# Patient Record
Sex: FEMALE | Race: WHITE | NOT HISPANIC OR LATINO | Employment: OTHER | URBAN - METROPOLITAN AREA
[De-identification: names, ages, dates, MRNs, and addresses within clinical notes are randomized per-mention and may not be internally consistent; named-entity substitution may affect disease eponyms.]

---

## 2024-03-12 ENCOUNTER — OFFICE VISIT (OUTPATIENT)
Dept: NEUROSURGERY | Facility: CLINIC | Age: 84
End: 2024-03-12
Payer: MEDICARE

## 2024-03-12 VITALS
SYSTOLIC BLOOD PRESSURE: 112 MMHG | BODY MASS INDEX: 22.77 KG/M2 | DIASTOLIC BLOOD PRESSURE: 71 MMHG | WEIGHT: 133.4 LBS | HEIGHT: 64 IN

## 2024-03-12 DIAGNOSIS — D32.9 MENINGIOMA: Primary | ICD-10-CM

## 2024-03-12 PROCEDURE — 99203 OFFICE O/P NEW LOW 30 MIN: CPT | Performed by: NEUROLOGICAL SURGERY

## 2024-03-12 PROCEDURE — 1160F RVW MEDS BY RX/DR IN RCRD: CPT | Performed by: NEUROLOGICAL SURGERY

## 2024-03-12 PROCEDURE — 1159F MED LIST DOCD IN RCRD: CPT | Performed by: NEUROLOGICAL SURGERY

## 2024-03-12 RX ORDER — AZELASTINE HYDROCHLORIDE 137 UG/1
SPRAY, METERED NASAL
COMMUNITY
Start: 2024-01-09

## 2024-03-12 RX ORDER — BENZONATATE 100 MG/1
100 CAPSULE ORAL 2 TIMES DAILY PRN
COMMUNITY
Start: 2024-01-30

## 2024-03-12 RX ORDER — ALPRAZOLAM 0.5 MG/1
0.5 TABLET ORAL NIGHTLY PRN
COMMUNITY

## 2024-03-12 RX ORDER — PANTOPRAZOLE SODIUM 40 MG/1
40 TABLET, DELAYED RELEASE ORAL DAILY
COMMUNITY
Start: 2023-06-20 | End: 2024-06-20

## 2024-03-12 RX ORDER — ALBUTEROL SULFATE 90 UG/1
2 AEROSOL, METERED RESPIRATORY (INHALATION)
COMMUNITY

## 2024-03-12 RX ORDER — AZITHROMYCIN 250 MG/1
250 TABLET, FILM COATED ORAL DAILY
COMMUNITY

## 2024-03-12 RX ORDER — ASPIRIN 81 MG/1
81 TABLET, CHEWABLE ORAL DAILY
COMMUNITY

## 2024-03-12 NOTE — PROGRESS NOTES
"Chief Complaint  Brain Tumor    Subjective          Kanchan Vaughn who is a 83 y.o. year old female who presents to [facility] for Brain Tumor.  Patient presented with Vision Difficulty for 6 months.  Patient was found to have Enhancing Mass in the Right  frontal temporal region engulfing the right ICA and the optic nerve.     Associated Symptoms Include Vision Difficulty in the right eye  Previous Treatments Include:  She has had no treatment    History of Previous Cancer?: No  Family History of Brain Cancer?: No    The Patient is Right handed.    Review of Systems   Eyes:  Positive for visual disturbance.        Objective   Vital Signs:   /71 (BP Location: Left arm, Patient Position: Sitting)   Ht 162.6 cm (64\")   Wt 60.5 kg (133 lb 6.4 oz)   BMI 22.90 kg/m²       Physical Exam  Constitutional:       Appearance: She is normal weight.   Pulmonary:      Comments: Labored breathing on home O2  Neurological:      Mental Status: She is alert and oriented to person, place, and time.   Psychiatric:         Mood and Affect: Mood normal.          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    She has 5 mm of midline shift.        Assessment and Plan    Diagnoses and all orders for this visit:    1. Meningioma (Primary)    Surgery in her situation would carry more risk than benefit. Her lung function is quite poor to pursue a major (5-6 hour) surgery.     She could benefit from Decadron for the vasogenic edema. She is currently on prednisone, she will talk with her pulmonologist to see if this would be a reasonable substitute.     Follow Up   Return if symptoms worsen or fail to improve.  Patient was given instructions and counseling regarding her condition or for health maintenance advice. Please see specific information pulled into the AVS if " appropriate.

## 2025-02-22 ENCOUNTER — HOSPITAL ENCOUNTER (OUTPATIENT)
Dept: OTHER | Facility: HOSPITAL | Age: 85
Discharge: HOME | End: 2025-02-22
Payer: MEDICARE

## 2025-02-22 PROBLEM — J44.1 DECOMPENSATED COPD WITH EXACERBATION (CHRONIC OBSTRUCTIVE PULMONARY DISEASE): Status: ACTIVE | Noted: 2025-02-22
